# Patient Record
Sex: FEMALE
[De-identification: names, ages, dates, MRNs, and addresses within clinical notes are randomized per-mention and may not be internally consistent; named-entity substitution may affect disease eponyms.]

---

## 2017-06-30 ENCOUNTER — HOSPITAL ENCOUNTER (OUTPATIENT)
Dept: HOSPITAL 53 - M SMT | Age: 27
End: 2017-06-30
Attending: ADVANCED PRACTICE MIDWIFE
Payer: COMMERCIAL

## 2017-06-30 DIAGNOSIS — Z34.81: Primary | ICD-10-CM

## 2017-06-30 LAB
BASOPHILS # BLD AUTO: 0 K/MM3 (ref 0–0.2)
BASOPHILS NFR BLD AUTO: 0.7 % (ref 0–1)
EOSINOPHIL # BLD AUTO: 0.1 K/MM3 (ref 0–0.5)
EOSINOPHIL NFR BLD AUTO: 1.2 % (ref 0–3)
ERYTHROCYTE [DISTWIDTH] IN BLOOD BY AUTOMATED COUNT: 13 % (ref 11.5–14.5)
LARGE UNSTAINED CELL #: 0.1 K/MM3 (ref 0–0.4)
LARGE UNSTAINED CELL %: 0.9 % (ref 0–4)
LYMPHOCYTES # BLD AUTO: 1.8 K/MM3 (ref 1.5–6.5)
LYMPHOCYTES NFR BLD AUTO: 25.4 % (ref 24–44)
MCH RBC QN AUTO: 32.1 PG (ref 27–33)
MCHC RBC AUTO-ENTMCNC: 34.9 G/DL (ref 32–36.5)
MCV RBC AUTO: 92 FL (ref 80–96)
MONOCYTES # BLD AUTO: 0.4 K/MM3 (ref 0–0.8)
MONOCYTES NFR BLD AUTO: 5.3 % (ref 0–5)
NEUTROPHILS # BLD AUTO: 4.5 K/MM3 (ref 1.8–7.7)
NEUTROPHILS NFR BLD AUTO: 66.5 % (ref 36–66)
WBC # BLD AUTO: 6.7 K/MM3 (ref 4–10)

## 2017-07-03 LAB
HBSAG PRENATAL: NEGATIVE
PLATELET # BLD AUTO: 336 K/MM3 (ref 150–450)

## 2017-07-10 ENCOUNTER — HOSPITAL ENCOUNTER (EMERGENCY)
Dept: HOSPITAL 53 - M ED | Age: 27
Discharge: HOME | End: 2017-07-10
Payer: COMMERCIAL

## 2017-07-10 VITALS — WEIGHT: 140.3 LBS | HEIGHT: 64 IN | BODY MASS INDEX: 23.95 KG/M2

## 2017-07-10 VITALS — SYSTOLIC BLOOD PRESSURE: 102 MMHG | DIASTOLIC BLOOD PRESSURE: 60 MMHG

## 2017-07-10 DIAGNOSIS — O98.819: Primary | ICD-10-CM

## 2017-07-10 DIAGNOSIS — Z3A.00: ICD-10-CM

## 2017-09-18 ENCOUNTER — HOSPITAL ENCOUNTER (OUTPATIENT)
Dept: HOSPITAL 53 - M SMT | Age: 27
End: 2017-09-18
Attending: OBSTETRICS & GYNECOLOGY
Payer: COMMERCIAL

## 2017-09-18 DIAGNOSIS — Z3A.19: ICD-10-CM

## 2017-09-18 DIAGNOSIS — Z36: Primary | ICD-10-CM

## 2017-09-18 NOTE — REP
Obstetric ultrasound for fetal anatomy:

 

There is a single intrauterine gestation in a vertex presentation.  There is

fetal motion and cardiac activity.  The fetal heart rate is 139 beats per

minute.

 

Placenta is posterior.  There is no placenta previa or abruptio.  The placenta is

grade zero.

 

The amniotic fluid volume subjectively is normal.  The cervix is 4.1 cm length.

Maternal adnexa and cul-de-sac are unremarkable.

 

Based on today's ultrasound the gestational age is 19 weeks 2 days with an ZEFERINO of

two 10/20/2018.  The LMP is unknown.

 

Fetal weight is 311 grams (0 pounds, 10 ounces).  This is the 61st percentile for

19 weeks 2 days.

 

The following fetal anatomic structures are identified and are unremarkable:

 

Cranium, choroid plexus, cavum septum pellucidum, cerebellum/posterior fossa,

fetal face, facial profile, upper lip, lungs, four-chamber heart, cardiac right

and left ventricular outflow tracts, diaphragm, stomach, cord insertion,

three-vessel cord, kidneys, bladder, spine and upper lower extremities.

 

No fetal anomalies are identified.

 

 

Signed by

Usama Duffy MD 09/18/2017 12:37 P

## 2017-09-27 ENCOUNTER — HOSPITAL ENCOUNTER (OUTPATIENT)
Dept: HOSPITAL 53 - M RAD | Age: 27
End: 2017-09-27
Attending: ADVANCED PRACTICE MIDWIFE
Payer: COMMERCIAL

## 2017-09-27 DIAGNOSIS — M25.561: Primary | ICD-10-CM

## 2017-09-27 DIAGNOSIS — M25.562: ICD-10-CM

## 2017-09-28 NOTE — REP
Clinical: Bilateral knee pain .

 

Technique:  Gray scale and color Doppler evaluation using linear high frequency

transducer.

 

Findings:

Ultrasound examination of the right and left lower extremity deep venous

structures from the common femoral vein to the popliteal vein demonstrates normal

compressibility flow and wave patterns in response to respiration and

augmentation.  There is no evidence for deep venous thrombosis. Incidental note

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

 

Impression:

No evidence for deep venous thrombosis the bilateral lower extremities.

 

 

Signed by

Anshu Lindsey MD 09/28/2017 08:17 A

## 2017-11-14 ENCOUNTER — HOSPITAL ENCOUNTER (OUTPATIENT)
Dept: HOSPITAL 53 - M LDO | Age: 27
Discharge: HOME | End: 2017-11-14
Attending: ADVANCED PRACTICE MIDWIFE
Payer: COMMERCIAL

## 2017-11-14 VITALS — SYSTOLIC BLOOD PRESSURE: 107 MMHG | DIASTOLIC BLOOD PRESSURE: 59 MMHG

## 2017-11-14 VITALS — WEIGHT: 158.95 LBS | BODY MASS INDEX: 27.14 KG/M2 | HEIGHT: 64 IN

## 2017-11-14 VITALS — SYSTOLIC BLOOD PRESSURE: 103 MMHG | DIASTOLIC BLOOD PRESSURE: 62 MMHG

## 2017-11-14 DIAGNOSIS — N39.0: ICD-10-CM

## 2017-11-14 DIAGNOSIS — O99.89: Primary | ICD-10-CM

## 2017-11-14 DIAGNOSIS — Z3A.27: ICD-10-CM

## 2017-11-14 LAB
ALBUMIN SERPL BCG-MCNC: 2.7 GM/DL (ref 3.2–5.2)
ANION GAP SERPL CALC-SCNC: 6 MEQ/L (ref 8–16)
BUN SERPL-MCNC: 7 MG/DL (ref 7–18)
CALCIUM SERPL-MCNC: 8.1 MG/DL (ref 8.5–10.1)
CHLORIDE SERPL-SCNC: 109 MEQ/L (ref 98–107)
CO2 SERPL-SCNC: 26 MEQ/L (ref 21–32)
CREAT SERPL-MCNC: 0.54 MG/DL (ref 0.55–1.02)
ERYTHROCYTE [DISTWIDTH] IN BLOOD BY AUTOMATED COUNT: 13 % (ref 11.5–14.5)
GFR SERPL CREATININE-BSD FRML MDRD: > 60 ML/MIN/{1.73_M2} (ref 60–?)
GLUCOSE SERPL-MCNC: 72 MG/DL (ref 70–105)
MCH RBC QN AUTO: 31.8 PG (ref 27–33)
MCHC RBC AUTO-ENTMCNC: 34 G/DL (ref 32–36.5)
MCV RBC AUTO: 93.6 FL (ref 80–96)
NRBC BLD AUTO-RTO: 0 % (ref 0–0)
PHOSPHATE SERPL-MCNC: 3.1 MG/DL (ref 2.5–4.9)
PLATELET # BLD AUTO: 241 10^3/UL (ref 150–450)
POTASSIUM SERPL-SCNC: 3.7 MEQ/L (ref 3.5–5.1)
SODIUM SERPL-SCNC: 141 MEQ/L (ref 136–145)
WBC # BLD AUTO: 7.1 10^3/UL (ref 4–10)

## 2017-11-14 NOTE — REPUSA
Clinical history: Renal failure.

Findings: The urinary bladder demonstrates echogenic non-shadowing debris. Normal ureteral Jets are n
oted. No urinary bladder masses are seen. The right kidney measures 11.8 x 5.2 x 4.7 cm. The left kid
kt measures 12.6 x 5.6 x 5.4 cm. The kidneys demonstrate normal echotexture and echogenicity. There 
is no evidence of hydronephrosis or nephrolithiasis. No renal masses are seen. No free fluid is appre
ciated. Single intrauterine pregnancy is noted. Fetal heart rate measures 139 bpm.

Impression:

1. Unremarkable ultrasound examination of the kidneys.

2. Debris is seen within the urinary bladder. Differential diagnosis includes infectious versus hemor
rhagic material. Follow-up is recommended as clinically indicated.

     Electronically signed by MALIKA SAAB MD on 11/14/2017 10:51:39 PM ET

## 2017-11-29 ENCOUNTER — HOSPITAL ENCOUNTER (OUTPATIENT)
Dept: HOSPITAL 53 - M SMT | Age: 27
End: 2017-11-29
Attending: ADVANCED PRACTICE MIDWIFE
Payer: COMMERCIAL

## 2017-11-29 DIAGNOSIS — Z34.83: Primary | ICD-10-CM

## 2017-11-29 LAB
BASOPHILS # BLD AUTO: 0 10^3/UL (ref 0–0.2)
BASOPHILS NFR BLD AUTO: 0.6 % (ref 0–1)
EOSINOPHIL # BLD AUTO: 0.1 10^3/UL (ref 0–0.5)
EOSINOPHIL NFR BLD AUTO: 1.3 % (ref 0–3)
ERYTHROCYTE [DISTWIDTH] IN BLOOD BY AUTOMATED COUNT: 13.1 % (ref 11.5–14.5)
IMM GRANULOCYTES NFR BLD: 0.4 % (ref 0–0)
LYMPHOCYTES # BLD AUTO: 1 10^3/UL (ref 1.5–6.5)
LYMPHOCYTES NFR BLD AUTO: 18 % (ref 24–44)
MCH RBC QN AUTO: 30.9 PG (ref 27–33)
MCHC RBC AUTO-ENTMCNC: 32.2 G/DL (ref 32–36.5)
MCV RBC AUTO: 95.8 FL (ref 80–96)
MONOCYTES # BLD AUTO: 0.3 10^3/UL (ref 0–0.8)
MONOCYTES NFR BLD AUTO: 5.9 % (ref 0–5)
NEUTROPHILS # BLD AUTO: 4 10^3/UL (ref 1.8–7.7)
NEUTROPHILS NFR BLD AUTO: 73.8 % (ref 36–66)
NRBC BLD AUTO-RTO: 0 % (ref 0–0)
PLATELET # BLD AUTO: 259 10^3/UL (ref 150–450)
WBC # BLD AUTO: 5.4 10^3/UL (ref 4–10)

## 2017-12-18 ENCOUNTER — HOSPITAL ENCOUNTER (OUTPATIENT)
Dept: HOSPITAL 53 - M LAB | Age: 27
End: 2017-12-18
Attending: ADVANCED PRACTICE MIDWIFE
Payer: COMMERCIAL

## 2017-12-18 DIAGNOSIS — Z36.89: Primary | ICD-10-CM

## 2018-01-17 ENCOUNTER — HOSPITAL ENCOUNTER (OUTPATIENT)
Dept: HOSPITAL 53 - M LAB REF | Age: 28
End: 2018-01-17
Payer: COMMERCIAL

## 2018-01-17 DIAGNOSIS — Z34.83: Primary | ICD-10-CM

## 2018-02-17 ENCOUNTER — HOSPITAL ENCOUNTER (INPATIENT)
Dept: HOSPITAL 53 - M LDI | Age: 28
LOS: 2 days | Discharge: HOME | DRG: 560 | End: 2018-02-19
Attending: ADVANCED PRACTICE MIDWIFE | Admitting: ADVANCED PRACTICE MIDWIFE
Payer: COMMERCIAL

## 2018-02-17 DIAGNOSIS — Z3A.40: ICD-10-CM

## 2018-02-17 DIAGNOSIS — O48.0: Primary | ICD-10-CM

## 2018-02-17 LAB
AMPHETAMINES UR QL SCN: NEGATIVE
BARBITURATES UR QL SCN: NEGATIVE
BENZODIAZ UR QL SCN: NEGATIVE
BZE UR QL SCN: NEGATIVE
CANNABINOIDS UR QL SCN: NEGATIVE
HEMATOCRIT: 30 % (ref 36–47)
HEMOGLOBIN: 9.9 G/DL (ref 12–16)
MEAN CORPUSCULAR HEMOGLOBIN: 28.5 PG (ref 27–33)
MEAN CORPUSCULAR HGB CONC: 33 G/DL (ref 32–36.5)
MEAN CORPUSCULAR VOLUME: 86.5 FL (ref 80–96)
METHADONE URINE REFLEX: NEGATIVE
NRBC BLD AUTO-RTO: 0 % (ref 0–0)
OPIATES UR QL SCN: NEGATIVE
PCP UR QL SCN: NEGATIVE
PLATELET COUNT, AUTOMATED: 216 10^3/UL (ref 150–450)
RED BLOOD COUNT: 3.47 10^6/UL (ref 4–5.4)
RED CELL DISTRIBUTION WIDTH: 13.9 % (ref 11.5–14.5)
WHITE BLOOD COUNT: 6.1 10^3/UL (ref 4–10)

## 2018-02-17 PROCEDURE — 10907ZC DRAINAGE OF AMNIOTIC FLUID, THERAPEUTIC FROM PRODUCTS OF CONCEPTION, VIA NATURAL OR ARTIFICIAL OPENING: ICD-10-PCS

## 2018-02-17 PROCEDURE — 3E0DXGC INTRODUCTION OF OTHER THERAPEUTIC SUBSTANCE INTO MOUTH AND PHARYNX, EXTERNAL APPROACH: ICD-10-PCS

## 2018-02-17 RX ADMIN — MISOPROSTOL 1 MCG: 100 TABLET ORAL at 09:24

## 2018-02-17 RX ADMIN — IBUPROFEN 1 MG: 800 TABLET, FILM COATED ORAL at 19:54

## 2018-02-17 RX ADMIN — PROMETHAZINE HYDROCHLORIDE 1 MG: 25 INJECTION INTRAMUSCULAR; INTRAVENOUS at 16:14

## 2018-02-17 RX ADMIN — BUTORPHANOL TARTRATE 1 MG: 2 INJECTION, SOLUTION INTRAMUSCULAR; INTRAVENOUS at 16:14

## 2018-02-17 RX ADMIN — Medication 1 MLS/HR: at 18:20

## 2018-02-17 RX ADMIN — MISOPROSTOL 1 MCG: 100 TABLET ORAL at 13:32

## 2018-02-18 RX ADMIN — Medication 1 TAB: at 09:01

## 2018-02-18 RX ADMIN — ACETAMINOPHEN 1 MG: 500 TABLET ORAL at 00:48

## 2018-02-18 RX ADMIN — ACETAMINOPHEN 1 MG: 500 TABLET ORAL at 16:44

## 2018-02-18 RX ADMIN — IBUPROFEN 1 MG: 800 TABLET, FILM COATED ORAL at 09:02

## 2018-02-19 LAB — SYPHILIS: NONREACTIVE

## 2018-02-19 RX ADMIN — MEASLES, MUMPS, AND RUBELLA VIRUS VACCINE LIVE 1 ML: 1000; 12500; 1000 INJECTION, POWDER, LYOPHILIZED, FOR SUSPENSION SUBCUTANEOUS at 08:31

## 2018-02-19 RX ADMIN — ACETAMINOPHEN 1 MG: 500 TABLET ORAL at 08:28

## 2018-02-19 RX ADMIN — IBUPROFEN 1 MG: 800 TABLET, FILM COATED ORAL at 14:00

## 2018-02-19 RX ADMIN — IBUPROFEN 1 MG: 800 TABLET, FILM COATED ORAL at 01:47

## 2018-02-19 RX ADMIN — Medication 1 TAB: at 08:28

## 2018-03-28 ENCOUNTER — HOSPITAL ENCOUNTER (OUTPATIENT)
Dept: HOSPITAL 53 - M LAB REF | Age: 28
End: 2018-03-28
Attending: ADVANCED PRACTICE MIDWIFE
Payer: COMMERCIAL

## 2018-03-28 DIAGNOSIS — R30.0: Primary | ICD-10-CM

## 2018-03-28 PROCEDURE — 87088 URINE BACTERIA CULTURE: CPT

## 2018-03-28 PROCEDURE — 87186 SC STD MICRODIL/AGAR DIL: CPT

## 2020-12-16 ENCOUNTER — HOSPITAL ENCOUNTER (OUTPATIENT)
Dept: HOSPITAL 53 - M PLALAB | Age: 30
End: 2020-12-16
Attending: OBSTETRICS & GYNECOLOGY
Payer: SELF-PAY

## 2020-12-16 DIAGNOSIS — Z3A.09: Primary | ICD-10-CM

## 2021-01-11 ENCOUNTER — HOSPITAL ENCOUNTER (OUTPATIENT)
Dept: HOSPITAL 53 - M PLALAB | Age: 31
End: 2021-01-11
Attending: OBSTETRICS & GYNECOLOGY
Payer: SELF-PAY

## 2021-01-11 DIAGNOSIS — Z34.91: Primary | ICD-10-CM

## 2021-01-11 DIAGNOSIS — Z3A.09: ICD-10-CM

## 2021-01-11 LAB
HCT VFR BLD AUTO: 33.9 % (ref 36–47)
HCV AB SER QL: < 0 INDEX (ref ?–0.8)
HGB BLD-MCNC: 11.3 G/DL (ref 12–15.5)
HIV 1+2 AB+HIV1 P24 AG SERPL QL IA: NEGATIVE
MCH RBC QN AUTO: 31.4 PG (ref 27–33)
MCHC RBC AUTO-ENTMCNC: 33.3 G/DL (ref 32–36.5)
MCV RBC AUTO: 94.2 FL (ref 80–96)
PLATELET # BLD AUTO: 273 10^3/UL (ref 150–450)
RBC # BLD AUTO: 3.6 10^6/UL (ref 4–5.4)
WBC # BLD AUTO: 6.1 10^3/UL (ref 4–10)

## 2021-01-15 ENCOUNTER — HOSPITAL ENCOUNTER (OUTPATIENT)
Dept: HOSPITAL 53 - M SFHCWAGY | Age: 31
End: 2021-01-15
Attending: HOSPITALIST
Payer: COMMERCIAL

## 2021-01-15 DIAGNOSIS — Z3A.09: Primary | ICD-10-CM

## 2021-01-15 LAB
CHLAMYDIA DNA AMPLIFICATION: NEGATIVE
N GONORRHOEA RRNA SPEC QL NAA+PROBE: NEGATIVE

## 2021-02-12 ENCOUNTER — HOSPITAL ENCOUNTER (OUTPATIENT)
Dept: HOSPITAL 53 - M SFHCWAGY | Age: 31
End: 2021-02-12
Attending: OBSTETRICS & GYNECOLOGY
Payer: COMMERCIAL

## 2021-02-12 DIAGNOSIS — Z3A.09: Primary | ICD-10-CM

## 2021-02-25 ENCOUNTER — HOSPITAL ENCOUNTER (OUTPATIENT)
Dept: HOSPITAL 53 - M WHC | Age: 31
End: 2021-02-25
Attending: ADVANCED PRACTICE MIDWIFE
Payer: COMMERCIAL

## 2021-02-25 DIAGNOSIS — Z3A.19: ICD-10-CM

## 2021-02-25 DIAGNOSIS — Z34.82: Primary | ICD-10-CM

## 2021-02-25 NOTE — REP
INDICATION:

ANATOMY.  Nineteen weeks 1 day from known ZEFERINO July 21, 2021.



COMPARISON:

None.



TECHNIQUE:

Transabdominal obstetric sonography.



FINDINGS:

Scanning through the gravid uterus demonstrates a viable single intrauterine gestation

in breech fetal lie.  Fetal motion is observed and fetal heart rate is recorded at 161

beats per minute.  A anterior placenta is seen, grade 0, without evidence of placenta

previa. Closed cervical length is measured at 3.4 cm transabdominally.  No

extrauterine abnormality is observed.  Amniotic fluid is subjectively normal.



No fetal anomaly is seen.  The following fetal anatomic structures are identified and

felt to be sonographically unremarkable:  Fetal cranium, choroid plexus, cavum,

cerebellum and posterior fossa, face and profile, lungs, four-chamber heart with left

and right ventricular outflow tract views, diaphragm, left-sided stomach, abdominal

wall cord insertion, three-vessel umbilical cord, kidneys and bladder, spine, and

upper and lower extremities.



Biometry chart:

BPD 4.5 cm, 19 weeks 4 days

Head circumference 17.0 cm, 19 weeks 4 days

Abdominal circumference 14.7 cm, 20 weeks 0 days

Femur length 3.2 cm, 19 weeks 6 days

Humeral length 3.0 cm, 20 weeks 0 days

HC AC ratio normal 1.16

Cephalic index normal 0.72

Estimated fetal weight 322 g, 0 lb 11 oz, 86th percentile for 19 weeks 1 day



IMPRESSION:

Viable single intrauterine gestation at 19 weeks 6 days by today's composite

sonographic criteria.  ZEFERINO by today's sonography July 16, 2021.  No complication

identified.



Expected gestational age estimate based on prior sonography is 19 weeks 1 day.  ZEFERINO by

prior sonography 21 July 2021.





<Electronically signed by Anthony Laguerre > 02/25/21 [id number]

## 2021-03-12 ENCOUNTER — HOSPITAL ENCOUNTER (OUTPATIENT)
Dept: HOSPITAL 53 - M PLALAB | Age: 31
End: 2021-03-12
Attending: OBSTETRICS & GYNECOLOGY
Payer: COMMERCIAL

## 2021-03-12 DIAGNOSIS — Z53.9: ICD-10-CM

## 2021-03-12 DIAGNOSIS — Z3A.21: ICD-10-CM

## 2021-03-12 DIAGNOSIS — Z34.92: Primary | ICD-10-CM

## 2021-04-30 ENCOUNTER — HOSPITAL ENCOUNTER (OUTPATIENT)
Dept: HOSPITAL 53 - M PLALAB | Age: 31
End: 2021-04-30
Attending: OBSTETRICS & GYNECOLOGY
Payer: COMMERCIAL

## 2021-04-30 DIAGNOSIS — Z3A.09: ICD-10-CM

## 2021-04-30 DIAGNOSIS — Z34.91: Primary | ICD-10-CM

## 2021-05-25 ENCOUNTER — HOSPITAL ENCOUNTER (OUTPATIENT)
Dept: HOSPITAL 53 - M WHC | Age: 31
End: 2021-05-25
Attending: ADVANCED PRACTICE MIDWIFE
Payer: MEDICAID

## 2021-05-25 DIAGNOSIS — Z3A.00: ICD-10-CM

## 2021-05-25 DIAGNOSIS — O24.419: Primary | ICD-10-CM

## 2021-06-02 ENCOUNTER — HOSPITAL ENCOUNTER (OUTPATIENT)
Dept: HOSPITAL 53 - M WHC | Age: 31
End: 2021-06-02
Attending: ADVANCED PRACTICE MIDWIFE
Payer: COMMERCIAL

## 2021-06-02 DIAGNOSIS — O24.419: Primary | ICD-10-CM

## 2021-06-02 DIAGNOSIS — Z3A.33: ICD-10-CM

## 2021-06-02 NOTE — REP
INDICATION:

GROWTH/DIABETES



COMPARISON:

02/25/2021



TECHNIQUE:

Transabdominal obstetrical ultrasound with color Doppler evaluation.



FINDINGS:

Examination demonstrates a single live intrauterine pregnancy in cephalic

presentation.  Fetal motion is identified by technologist.  Placenta is noted anterior

and  grade 1 without evidence for placenta previa or abruption.  Amniotic fluid volume

is normal.  Cervix measures 3.2 cm in length and appears closed..



Gestational age by LMP and 1st U/S 33 weeks 0 days with ZEFERINO 07/21/2021.

Gestational age by current measurements 33 weeks 6 days with ZEFERINO 07/15/2021.



FHR equals 140 beats per minute.



BPD:      8.6 cm at        34 weeks 4 days

HC:         30.8 cm at         34 weeks 3 days

AC:         30.1 cm at      34 weeks 0 days

FL:          6.6 cm at      34 weeks 0 days

HL:          5.1 cm at      32 weeks 1 day

HC/AC: 1.02

Estimated fetal weight 2350 grams (75thpercentile).



TOMEKA: 12.4 cm

Umbilical artery SD ratio: 2.33 (1.79-3.77)



IMPRESSION:

Single live advanced gestation in cephalic presentation demonstrating appropriate

amniotic fluid index and estimated fetal weight.





<Electronically signed by Anshu Lindsey > 06/02/21 [id number]

## 2021-06-22 ENCOUNTER — HOSPITAL ENCOUNTER (OUTPATIENT)
Dept: HOSPITAL 53 - M WHC | Age: 31
End: 2021-06-22
Attending: ADVANCED PRACTICE MIDWIFE
Payer: COMMERCIAL

## 2021-06-22 DIAGNOSIS — O24.419: Primary | ICD-10-CM

## 2021-06-22 DIAGNOSIS — Z3A.36: ICD-10-CM

## 2021-06-22 NOTE — REP
INDICATION:

GROWTH.



COMPARISON:

None.



TECHNIQUE:

Transabdominal



FINDINGS:

Multiple ultrasonographic images of the gravid uterus shows a single living

intrauterine gestation in the cephalic presentation.  Doppler interrogation of the

fetal heart shows a heart rate of 147 beats per minute.  The placenta is anterior and

not low lying.  Secondary to the low position of the fetal head inaccurate cervical

length measurement could not be obtained.  Doppler interrogation of the umbilical

artery shows an AB ratio of 2.26.  This is within the normal range.  The subjective

amniotic fluid volume is within normal limits.  The calculated amniotic fluid index is

11.0 within expected range 7.7 to 24.9.



BPD: 9 cm 36 weeks 4 days

HC: 31.9 cm 35 weeks 6 days

AC: 32.1 cm 36 weeks 0 days

FL: 7.1 cm 36 weeks 3 days

The estimated fetal weight is 2865 g which is at the 59th percentile for 35 week 6 day

gestational age.



IMPRESSION:

Single living intrauterine gestation as described above with an estimated gestational

age of 36 weeks 1 day via composite criteria and an estimated date of delivery of

07/19/2021 by today's exam.





<Electronically signed by Jesus Irizarry > 06/22/21 [id number]

## 2021-07-02 ENCOUNTER — HOSPITAL ENCOUNTER (OUTPATIENT)
Dept: HOSPITAL 53 - M SFHCWAGY | Age: 31
End: 2021-07-02
Attending: ADVANCED PRACTICE MIDWIFE
Payer: COMMERCIAL

## 2021-07-02 DIAGNOSIS — O24.419: Primary | ICD-10-CM

## 2021-07-02 DIAGNOSIS — Z3A.00: ICD-10-CM

## 2021-07-12 ENCOUNTER — HOSPITAL ENCOUNTER (INPATIENT)
Dept: HOSPITAL 53 - M WHC | Age: 31
LOS: 2 days | Discharge: HOME | DRG: 560 | End: 2021-07-14
Attending: ADVANCED PRACTICE MIDWIFE | Admitting: ADVANCED PRACTICE MIDWIFE
Payer: COMMERCIAL

## 2021-07-12 VITALS — DIASTOLIC BLOOD PRESSURE: 57 MMHG | SYSTOLIC BLOOD PRESSURE: 103 MMHG

## 2021-07-12 VITALS — SYSTOLIC BLOOD PRESSURE: 104 MMHG | DIASTOLIC BLOOD PRESSURE: 60 MMHG

## 2021-07-12 VITALS — SYSTOLIC BLOOD PRESSURE: 104 MMHG | DIASTOLIC BLOOD PRESSURE: 57 MMHG

## 2021-07-12 VITALS — DIASTOLIC BLOOD PRESSURE: 57 MMHG | SYSTOLIC BLOOD PRESSURE: 95 MMHG

## 2021-07-12 VITALS — SYSTOLIC BLOOD PRESSURE: 103 MMHG | DIASTOLIC BLOOD PRESSURE: 56 MMHG

## 2021-07-12 VITALS — BODY MASS INDEX: 29.02 KG/M2 | HEIGHT: 64 IN | WEIGHT: 169.98 LBS

## 2021-07-12 VITALS — DIASTOLIC BLOOD PRESSURE: 55 MMHG | SYSTOLIC BLOOD PRESSURE: 97 MMHG

## 2021-07-12 VITALS — DIASTOLIC BLOOD PRESSURE: 57 MMHG | SYSTOLIC BLOOD PRESSURE: 106 MMHG

## 2021-07-12 VITALS — DIASTOLIC BLOOD PRESSURE: 64 MMHG | SYSTOLIC BLOOD PRESSURE: 105 MMHG

## 2021-07-12 VITALS — DIASTOLIC BLOOD PRESSURE: 46 MMHG | SYSTOLIC BLOOD PRESSURE: 97 MMHG

## 2021-07-12 DIAGNOSIS — Z3A.38: ICD-10-CM

## 2021-07-12 DIAGNOSIS — O41.03X0: ICD-10-CM

## 2021-07-12 DIAGNOSIS — Z91.14: ICD-10-CM

## 2021-07-12 LAB
GLUCOSE SERPL-MCNC: 62 MG/DL (ref ?–200)
HCT VFR BLD AUTO: 32.5 % (ref 36–47)
HGB BLD-MCNC: 11.1 G/DL (ref 12–15.5)
MCH RBC QN AUTO: 32.5 PG (ref 27–33)
MCHC RBC AUTO-ENTMCNC: 34.2 G/DL (ref 32–36.5)
MCV RBC AUTO: 95 FL (ref 80–96)
PLATELET # BLD AUTO: 197 10^3/UL (ref 150–450)
RBC # BLD AUTO: 3.42 10^6/UL (ref 4–5.4)
WBC # BLD AUTO: 5.7 10^3/UL (ref 4–10)

## 2021-07-12 PROCEDURE — 3E033VJ INTRODUCTION OF OTHER HORMONE INTO PERIPHERAL VEIN, PERCUTANEOUS APPROACH: ICD-10-PCS | Performed by: ADVANCED PRACTICE MIDWIFE

## 2021-07-12 RX ADMIN — MISOPROSTOL SCH MCG: 100 TABLET ORAL at 18:09

## 2021-07-12 NOTE — REP
INDICATION:

FETAL GROWTH



COMPARISON:

06/22/2021



TECHNIQUE:

Transabdominal obstetrical ultrasound with color Doppler evaluation.



FINDINGS:

Examination demonstrates a single live intrauterine pregnancy in cephalic

presentation.  Fetal motion is identified by technologist.  Placenta is noted anterior

and  grade 2 without evidence for placenta previa or abruption.  Amniotic fluid volume

is normal.



TOMEKA: 6.8 cm (7.2-23.0)

Umbilical artery SD ratio: 1.94 (1.52-3.31)



Selected gestational age: 38 weeks 5 days with ZEFERINO 07/21/2021.

Gestational age by current measurements 37 weeks 2 days with ZEFERINO 07/31/2021.



FHR equals 152 beats per minute.



BPD:      9.3 cm at        38 weeks 0 days

HC:         32.6 cm at         37 weeks 0 days

AC:         35.4 cm at      39 weeks 2 days

FL:          7.2 cm at      37 weeks 0 days

HL:          6.1 cm at      35 weeks 0 days

HC/AC: 0.92

Estimated fetal weight 3476 grams (59thpercentile).



IMPRESSION:

1. Single live intrauterine pregnancy in cephalic presentation demonstrating

appropriate estimated fetal weight.

2. Amniotic fluid volume is below normal range suggesting oligohydramnios.





<Electronically signed by Anshu Lindsey > 07/12/21 [id number] intact

## 2021-07-12 NOTE — HPEPDOC
Obstetrical History & Physical


General


Date of Admission


2021 at 14:31


Primary Care Physician:  DEENA NI CNM





History of Present Illness


Shanna is a 30-year-old female who is a  who is 38.5 weeks gestation with 

an ZEFERINO of 21 based off of her LMP and consistent with her first trimester 

ultrasound done at 9 weeks. She initiated care in her first trimester with [facility].

Her pregnancy has been complicated by gestational diabetes. She was suppose to 

start Metformin and only did it for a few days because of a rash that started 

after she initiated Metformin. It was later discovered that the rash was a 

reaction to new lotion that she started and Metformin was restarted but patient 

reports she only took it for about a week. She was not compliant with taking her

blood sugars making it difficult to adjust medication. She presents to L&D today

for an induction of labor related to A2GDM and non-compliance with taking 

medication or blood sugars.


Chief Complaint:  Induction of labor (A2GDM-)


Information Provided By:  Family, 


Age:  30


:  5


Term:  4


Pre-term:  0


Abortions:  0


Livin





Prenatal Care


Prenatal Care:  [facility]





Prenatal Dating


Final EDC:  2021


Final EDC by:  LMP


EGA at Admission:  38.5





Antepartum Course


Prenatal Diagnos(e)s


A2GDM-not controlled


Height (inches):  64


Pre-Pregnancy weight (lbs.):  140


Admission Weight (lbs.):  169


Change in Weight (lbs.):  29





Past Medical History


Past Obstetrical History #1:  


   Past Obstetrical History:  Primgravida


   Date of Delivery:  2010


   Gestation:  40


   Type of Delivery:  Spontaneous Vaginal Del.


   Sex of Infant:  Female (weight: 7 kbs 5 oz)


   Complications:  Yes (GDM)


Past Obstetrical History #2:  


   Past Obstetrical History:  Multigravida


   Date of Delivery:  Mar 8, 2014


   Gestation:  41


   Type of Delivery:  Spontaneous Vaginal Del.


   Sex of Infant:  Male (weight 8 lbs 6 oz)


   Complications:  No


Past Obstetrical History #3:  


   Past Obstetrical History:  Multigravida


   Date of Delivery:  Mar 21, 2016


   Gestation:  40


   Type of Delivery:  Spontaneous Vaginal Del.


   Sex of Infant:  Male (weight 8 lbs 12 oz)


   Complications:  No


Past Obstetrical History #4:  


   Past Obstetrical History:  Multigravida


   Date of Delivery:  2018


   Gestation:  40.5


   Type of Delivery:  Spontaneous Vaginal Del.


   Sex of Infant:  Male (8 lbs 2 oz)


   Complications:  Yes (GDM)


GYN History:  Human papillomavirus(HPV)


Past Medical History


Medical History


kidney stones


shingles


Gestational diabetes x3


Surgical History:  Gallbladder, Other (lithotripsy)





Family History


Significant Family History:  Asthma





Social History


Marital Status:  


Family situation:  Spouse/partner home


Psychosocial History:  No pertinent psych hx


* Smoker:  non-smoker


Alcohol:  Denies


Drugs:  denies





Abuse Violence Screening


Have you been hit/kicked/slapp:  No


Have you been sexually assault:  No





Allergies


Coded Allergies:  


     latex (Verified  Allergy, Intermediate, Hives, 21)





Medications


Scheduled


Metformin HCl (Metformin HCl) 500 Mg Tablet, 500 MG PO DAILY


[id number]/Iron Fum/Folic/Docusate (Prenatal 19 Tablet) 1 Tab Tab, 1 TAB PO DAILY





Physical Examination


Physical Examination


GENERAL: Alert and oriented times three.


BREAST: .


RESPIRATORY: regular rate and rhythm


ABDOMEN: Gravid and non-tender to touch.


FETUS: Is vertex (VTX) by sterile vaginal examination (SVE), fetus is vertex 

(VTX) by Leopold.


EXTREMITIES: No edema. No clonus. Deep tendon reflexes (DTRs) + 2.





Vital Signs/I&O





Vital Signs








  Date Time  Temp Pulse Resp B/P (MAP) Pulse Ox O2 Delivery O2 Flow Rate FiO2


 


21 17:27  67 18 104/60 (75) 100 Room Air  


 


21 14:52 97.6       











Laboratory Data


24H LABS


Laboratory Tests 2


21 14:38: Serology Scanned Report Hepatitis B Testing


21 15:49: 


Nucleated Red Blood Cells % (auto) 0.0, Random Glucose 62, Syphilis Serology 

NONREACTIVE


CBC/BMP


Laboratory Tests


21 15:49








Urine Culture:  No Growth





Pertinent Laboratoy Data


Blood Type:  O+


RBC Antibody Screen:  Negative


HIV:  Negative


Hepatitis B:  Negative


Hepatitis C:  Negative


Rapid Plasma Reagin:  Nonreactive


Rubella:  Immune


Chlamydia/Gonorrhea:  Negative


Group B Streptococcus:  Positive


Glucose Tolerance Test:  157





Anatomy Ultrasound


Ultrasound Date:  2021


Placenta Location:  Anterior


Normal Anatomy:  Yes


Placenta Previa:  No


Estimated Fetal Weight (grams):  2865





Vaginal Examination


Dilation:  2cm


Effacement:  50%


Station:  -2


Cervical Consistency:  Soft


Cervical Position:  Middle


Fetal Presentation:  Cephalic presentation


Fetal Position:  Vertex (occiput)





Fetal Assessment


Fetal Heart Rate (FHR):  120


Variability:  Moderate


Accelerations:  Positive


Decelerations:  None





Tocometer


Contractions:  Yes


Frequency:  other (4-5 minutes)


Multi-drug resistant Organism:  No history of MDRO





Assessment/Plan


Assessment


IUP at 38.5 weeks gestation


GBS positive 


Category I FHR tracing


[id number] metformin-non-compliant





Plan


Admit to L&D. 


OOB ad carol. 


Diet: regular now then clear liquid diet with starting IV Pitocin.


Group B Streptococcus (GBS) positive. Start antibiotics per order. 


Labs and intravenous (IV) per unit protocol.


Counseled on Cytotec and IV Pitocin for induction of labor (IOL).


Anesthesia consult per patient's request. 


Anticipate cervical ripening. 


C-S as appropriate.











DEENA NI CNM            2021 18:06

## 2021-07-13 VITALS — SYSTOLIC BLOOD PRESSURE: 116 MMHG | DIASTOLIC BLOOD PRESSURE: 57 MMHG

## 2021-07-13 VITALS — DIASTOLIC BLOOD PRESSURE: 56 MMHG | SYSTOLIC BLOOD PRESSURE: 102 MMHG

## 2021-07-13 VITALS — DIASTOLIC BLOOD PRESSURE: 55 MMHG | SYSTOLIC BLOOD PRESSURE: 100 MMHG

## 2021-07-13 VITALS — SYSTOLIC BLOOD PRESSURE: 103 MMHG | DIASTOLIC BLOOD PRESSURE: 58 MMHG

## 2021-07-13 VITALS — DIASTOLIC BLOOD PRESSURE: 62 MMHG | SYSTOLIC BLOOD PRESSURE: 105 MMHG

## 2021-07-13 VITALS — SYSTOLIC BLOOD PRESSURE: 93 MMHG | DIASTOLIC BLOOD PRESSURE: 53 MMHG

## 2021-07-13 VITALS — DIASTOLIC BLOOD PRESSURE: 54 MMHG | SYSTOLIC BLOOD PRESSURE: 86 MMHG

## 2021-07-13 VITALS — SYSTOLIC BLOOD PRESSURE: 110 MMHG | DIASTOLIC BLOOD PRESSURE: 57 MMHG

## 2021-07-13 VITALS — DIASTOLIC BLOOD PRESSURE: 51 MMHG | SYSTOLIC BLOOD PRESSURE: 98 MMHG

## 2021-07-13 VITALS — SYSTOLIC BLOOD PRESSURE: 105 MMHG | DIASTOLIC BLOOD PRESSURE: 55 MMHG

## 2021-07-13 VITALS — DIASTOLIC BLOOD PRESSURE: 59 MMHG | SYSTOLIC BLOOD PRESSURE: 101 MMHG

## 2021-07-13 PROCEDURE — 10907ZC DRAINAGE OF AMNIOTIC FLUID, THERAPEUTIC FROM PRODUCTS OF CONCEPTION, VIA NATURAL OR ARTIFICIAL OPENING: ICD-10-PCS | Performed by: ADVANCED PRACTICE MIDWIFE

## 2021-07-13 RX ADMIN — MISOPROSTOL SCH MCG: 100 TABLET ORAL at 00:50

## 2021-07-13 RX ADMIN — IBUPROFEN PRN MG: 800 TABLET, FILM COATED ORAL at 15:48

## 2021-07-13 RX ADMIN — IBUPROFEN PRN MG: 800 TABLET, FILM COATED ORAL at 22:35

## 2021-07-13 RX ADMIN — Medication SCH MLS/HR: at 06:11

## 2021-07-13 RX ADMIN — Medication SCH MLS/HR: at 02:00

## 2021-07-13 RX ADMIN — SODIUM CHLORIDE, POTASSIUM CHLORIDE, SODIUM LACTATE AND CALCIUM CHLORIDE SCH MLS/HR: 600; 310; 30; 20 INJECTION, SOLUTION INTRAVENOUS at 06:12

## 2021-07-13 RX ADMIN — SODIUM CHLORIDE, POTASSIUM CHLORIDE, SODIUM LACTATE AND CALCIUM CHLORIDE SCH MLS/HR: 600; 310; 30; 20 INJECTION, SOLUTION INTRAVENOUS at 02:01

## 2021-07-13 RX ADMIN — Medication SCH MLS/HR: at 10:22

## 2021-07-13 NOTE — DNPDOC
[facility] Delivery Note


Delivery Note





DATE OF DELIVERY: 21 at 1151





PREDELIVERY DIAGNOSIS: 38-6/7 weeks' gestation and labor. 





POST DELIVERY DIAGNOSIS: Delivered.





PROCEDURE: Spontaneous vaginal delivery.





MIDWIFE: Deena Bowie CNM, ARCHANA





ANESTHESIA: none.





ESTIMATED BLOOD LOSS: 400 mL.





FINDINGS: 6 pounds 14 ounces; 3110 grams; female infant, Apgar Score 9/9, A2GDM.





DELIVERY SUMMARY: Shanna is a 30-year-old female who is now a  who 

presented to L&D for an induction of labor due to A2GDM with noncompliance with 

medication and recording blood sugars. She had 2 doses of Cytotec and IV Pitocin

for induction. Shanna requested IV pain medication for labor pain. She progressed 

to fully dilated at 1149 and pushed to a living female in OA position with 

restitution to ROT. The anterior shoulder delivered with ease and corpus 

immediately followed. The baby was placed skin-to skin active and crying. The 

cord was clamped after 1 minute and cut by the FOB. The placenta delivered sp

ontaneously and intact at 1156. Uterine hemostasis was achieved via rapid 

infusion of IV Pitocin, fundal massage, and IM Methergine. Her vagina, cervix 

and perineum were inspected and found to be intact. Mom plans to breast and 

formula feed. They are naming her Aliany. Both mom and baby are in stable 

condition. All counts of instruments and sponges are correct.











DEENA BOWIE CNM            2021 12:25

## 2021-07-13 NOTE — IPNPDOC
Obstetrical Progress Note


Date of Service


2021





Subjective


Patient reports she is feeling her contractions.





Objective





Vital Signs








  Date Time  Temp Pulse Resp B/P (MAP) Pulse Ox O2 Delivery O2 Flow Rate FiO2


 


21 04:33 98.4 74 16 98/51 (67)    


 


21 18:10     98 Room Air  











Fetal Assessment


Fetal Heart Rate (FHR):  120


Variability:  Moderate


Accelerations:  Positive


Decelerations:  None


Fetal Heart Rate Tracing:  Category I





Tocometer


Contractions:  Yes


Frequency:  other (3-6 minutes)





Sterile Vaginal Examination


Dilation:  3 cm (last check 4 hours ago was)


Effacement (%):  50%


Station:  -2


Fetal Postion/Presentation:  Cephalic presentation





Assessment and Plan


Age:  30


:  5


Term:  4


Pre-term:  0


Abortions:  0


Livin


EGA at Admission:  38.6


Fetal Status:  Reassuring


Group B Streptococcus:  Positive


Anticipate:  Vaginal Delivery


Additional Comments


She received 2 doses of Cytotec and IV Pitocin has been started per order.











DEENA NI CNM            2021 06:17

## 2021-07-13 NOTE — IPNPDOC
Obstetrical Progress Note


Date of Service


Jul 13, 2021





Subjective


Patient reports she is uncomfortable and desires IV Pain medication.





Objective





Vital Signs








  Date Time  Temp Pulse Resp B/P (MAP) Pulse Ox O2 Delivery O2 Flow Rate FiO2


 


7/13/21 08:11   20   Room Air  


 


7/13/21 07:10  73  102/56 (71)    


 


7/13/21 06:40 98.0       


 


7/12/21 18:10     98   











Fetal Assessment


Fetal Heart Rate (FHR):  125


Variability:  Moderate


Accelerations:  Positive


Decelerations:  None


Fetal Heart Rate Tracing:  Category I





Tocometer


Contractions:  Yes


Frequency:  regular





Sterile Vaginal Examination


Dilation:  4 cm (4-5 cm)


Effacement (%):  other (75%)


Station:  -1


Cervical Consistency:  Soft


Cervical Position:  Anterior


Fetal Postion/Presentation:  Cephalic presentation





Assessment and Plan


Fetal Status:  Reassuring


Group B Streptococcus:  Positive


Anticipate:  Vaginal Delivery


Additional Comments


AROM to moderate amount of clear fluid. Stadol and Phenergan IV ordered for pain

management. IV Pitocin at 10 mu/min.











DEENA NI CNM            Jul 13, 2021 08:32

## 2021-07-14 VITALS — SYSTOLIC BLOOD PRESSURE: 100 MMHG | DIASTOLIC BLOOD PRESSURE: 55 MMHG

## 2021-07-14 RX ADMIN — IBUPROFEN PRN MG: 800 TABLET, FILM COATED ORAL at 06:04

## 2021-08-16 ENCOUNTER — HOSPITAL ENCOUNTER (OUTPATIENT)
Dept: HOSPITAL 53 - M LABSMTC | Age: 31
End: 2021-08-16
Attending: ANESTHESIOLOGY
Payer: COMMERCIAL

## 2021-08-16 DIAGNOSIS — Z20.828: ICD-10-CM

## 2021-08-16 DIAGNOSIS — Z01.812: Primary | ICD-10-CM

## 2021-08-20 ENCOUNTER — HOSPITAL ENCOUNTER (OUTPATIENT)
Dept: HOSPITAL 53 - M SDC | Age: 31
Discharge: HOME | End: 2021-08-20
Attending: OBSTETRICS & GYNECOLOGY
Payer: COMMERCIAL

## 2021-08-20 VITALS — SYSTOLIC BLOOD PRESSURE: 102 MMHG | DIASTOLIC BLOOD PRESSURE: 62 MMHG

## 2021-08-20 VITALS — HEIGHT: 64 IN | BODY MASS INDEX: 26.94 KG/M2 | WEIGHT: 157.8 LBS

## 2021-08-20 DIAGNOSIS — Z30.2: Primary | ICD-10-CM

## 2021-08-20 LAB
HCT VFR BLD AUTO: 36.6 % (ref 36–47)
HGB BLD-MCNC: 12.4 G/DL (ref 12–15.5)
MCH RBC QN AUTO: 31.5 PG (ref 27–33)
MCHC RBC AUTO-ENTMCNC: 33.9 G/DL (ref 32–36.5)
MCV RBC AUTO: 92.9 FL (ref 80–96)
PLATELET # BLD AUTO: 243 10^3/UL (ref 150–450)
RBC # BLD AUTO: 3.94 10^6/UL (ref 4–5.4)
WBC # BLD AUTO: 5.2 10^3/UL (ref 4–10)

## 2021-08-20 PROCEDURE — 81025 URINE PREGNANCY TEST: CPT

## 2021-08-20 PROCEDURE — 88302 TISSUE EXAM BY PATHOLOGIST: CPT

## 2021-08-20 PROCEDURE — 85027 COMPLETE CBC AUTOMATED: CPT

## 2021-08-20 PROCEDURE — 36415 COLL VENOUS BLD VENIPUNCTURE: CPT

## 2021-08-20 PROCEDURE — 58661 LAPAROSCOPY REMOVE ADNEXA: CPT

## 2021-08-20 NOTE — ROOPDOC
[facility] Report Of Operation


Report of Operation


DATE OF PROCEDURE: 8/20/21





PREPROCEDURE DIAGNOSES: Undesired fertility.





POSTPROCEDURE DIAGNOSES: Same.





PROCEDURE PERFORMED: Laparoscopic bilateral salpingectomy. 





SURGEON: Genaro Montes MD





ANESTHESIA: GETA.





ESTIMATED BLOOD LOSS: Approximately 10 mL. 





COMPLICATIONS: none. 





FINDINGS: Normal uterus fallopian tubes and ovaries.  Normal upper abdomen.





SPECIMENS REMOVED: Bilateral fallopian tubes





PROCEDURE NOTE: The patient was taken to the operating room where general 

endotracheal anesthesia was induced.  She was prepped and draped in a sterile 

fashion in the dorsal lithotomy position.  A sponge stick was placed in the 

vagina to use as a manipulator.  A periumbilical incision was made with a 

scalpel.  A Veress needle was placed through this incision while tenting up on 

the skin of the abdomen.  An intra-abdominal location the Veress needle was 

assessed with the use of a saline filled syringe.  A pneumoperitoneum was 

created.  The Veress needle was removed.  A 5 mm trocar using Visiport was 

inserted through this incision.  A 5 and 8 mm suprapubic port were placed under 

direct visualization.  A grasping instrument was used to elevate each fallopian 

tube.  A LigaSure device was used to coagulate and incise broad ligament 

attachments to the fallopian tube.  Both tubes were excised near their origin.  

Both tubes were removed through the suprapubic port.  The pneumoperitoneum was 

released.  All instruments were removed.  The skin was closed with 4-0 Monocryl 

subcuticular sutures.  Sponge instrument and needle counts were correct.  The 

patient went to the recovery room in stable condition.











GENARO MONTES MD             Aug 20, 2021 11:13

## 2023-05-09 ENCOUNTER — HOSPITAL ENCOUNTER (OUTPATIENT)
Dept: HOSPITAL 53 - M SFHCWAGY | Age: 33
End: 2023-05-09
Attending: OBSTETRICS & GYNECOLOGY
Payer: COMMERCIAL

## 2023-05-09 DIAGNOSIS — Z01.419: Primary | ICD-10-CM

## 2023-05-10 ENCOUNTER — HOSPITAL ENCOUNTER (OUTPATIENT)
Dept: HOSPITAL 53 - M LAB REF | Age: 33
End: 2023-05-10
Payer: COMMERCIAL

## 2023-05-10 DIAGNOSIS — Z13.228: Primary | ICD-10-CM

## 2023-05-10 LAB
25(OH)D3 SERPL-MCNC: 13.2 NG/ML (ref 20–100)
ALBUMIN SERPL BCG-MCNC: 3.6 G/DL (ref 3.2–5.2)
ALP SERPL-CCNC: 72 U/L (ref 46–116)
ALT SERPL W P-5'-P-CCNC: 10 U/L (ref 7–40)
AST SERPL-CCNC: 21 U/L (ref ?–34)
BASOPHILS # BLD AUTO: 0.1 10^3/UL (ref 0–0.2)
BASOPHILS NFR BLD AUTO: 1.4 % (ref 0–1)
BILIRUB SERPL-MCNC: 0.4 MG/DL (ref 0.3–1.2)
BUN SERPL-MCNC: 9 MG/DL (ref 9–23)
CALCIUM SERPL-MCNC: 8.5 MG/DL (ref 8.5–10.1)
CHLORIDE SERPL-SCNC: 106 MMOL/L (ref 98–107)
CHOLEST SERPL-MCNC: 146 MG/DL (ref ?–200)
CHOLEST/HDLC SERPL: 3.55 {RATIO} (ref ?–5)
CO2 SERPL-SCNC: 28 MMOL/L (ref 20–31)
CREAT SERPL-MCNC: 0.63 MG/DL (ref 0.55–1.3)
EOSINOPHIL # BLD AUTO: 0.1 10^3/UL (ref 0–0.5)
EOSINOPHIL NFR BLD AUTO: 2.4 % (ref 0–3)
EST. AVERAGE GLUCOSE BLD GHB EST-MCNC: 94 MG/DL (ref 60–110)
GFR SERPL CREATININE-BSD FRML MDRD: > 60 ML/MIN/{1.73_M2} (ref 60–?)
GLUCOSE SERPL-MCNC: 86 MG/DL (ref 60–100)
HCT VFR BLD AUTO: 35.2 % (ref 36–47)
HDLC SERPL-MCNC: 41.1 MG/DL (ref 40–?)
HGB BLD-MCNC: 12 G/DL (ref 12–15.5)
LDLC SERPL CALC-MCNC: 70.9 MG/DL (ref ?–100)
LYMPHOCYTES # BLD AUTO: 1.7 10^3/UL (ref 1.5–5)
LYMPHOCYTES NFR BLD AUTO: 31.3 % (ref 24–44)
MCH RBC QN AUTO: 31.8 PG (ref 27–33)
MCHC RBC AUTO-ENTMCNC: 34.1 G/DL (ref 32–36.5)
MCV RBC AUTO: 93.4 FL (ref 80–96)
MONOCYTES # BLD AUTO: 0.3 10^3/UL (ref 0–0.8)
MONOCYTES NFR BLD AUTO: 5.4 % (ref 2–8)
NEUTROPHILS # BLD AUTO: 3.2 10^3/UL (ref 1.5–8.5)
NEUTROPHILS NFR BLD AUTO: 58.4 % (ref 36–66)
NONHDLC SERPL-MCNC: 104.9 MG/DL
PLATELET # BLD AUTO: 362 10^3/UL (ref 150–450)
POTASSIUM SERPL-SCNC: 3.9 MMOL/L (ref 3.5–5.1)
PROT SERPL-MCNC: 6.8 G/DL (ref 5.7–8.2)
RBC # BLD AUTO: 3.77 10^6/UL (ref 4–5.4)
SODIUM SERPL-SCNC: 140 MMOL/L (ref 136–145)
TRIGL SERPL-MCNC: 170 MG/DL (ref ?–150)
TSH SERPL DL<=0.005 MIU/L-ACNC: 1.31 UIU/ML (ref 0.55–4.78)
WBC # BLD AUTO: 5.5 10^3/UL (ref 4–10)

## 2023-06-07 ENCOUNTER — HOSPITAL ENCOUNTER (OUTPATIENT)
Dept: HOSPITAL 53 - M EKG | Age: 33
End: 2023-06-07
Attending: SURGERY
Payer: COMMERCIAL

## 2023-06-07 DIAGNOSIS — R00.1: ICD-10-CM

## 2023-06-07 DIAGNOSIS — Z01.818: Primary | ICD-10-CM

## 2023-06-09 ENCOUNTER — HOSPITAL ENCOUNTER (OUTPATIENT)
Dept: HOSPITAL 53 - M SDC | Age: 33
Discharge: HOME | End: 2023-06-09
Attending: SURGERY
Payer: COMMERCIAL

## 2023-06-09 VITALS — BODY MASS INDEX: 24.43 KG/M2 | WEIGHT: 146.6 LBS | HEIGHT: 65 IN

## 2023-06-09 VITALS — DIASTOLIC BLOOD PRESSURE: 63 MMHG | OXYGEN SATURATION: 100 % | SYSTOLIC BLOOD PRESSURE: 111 MMHG | TEMPERATURE: 97.7 F

## 2023-06-09 DIAGNOSIS — K42.9: Primary | ICD-10-CM

## 2023-06-09 PROCEDURE — 49591 RPR AA HRN 1ST < 3 CM RDC: CPT

## 2024-07-17 ENCOUNTER — HOSPITAL ENCOUNTER (INPATIENT)
Dept: HOSPITAL 53 - M ED | Age: 34
LOS: 1 days | Discharge: HOME | DRG: 663 | End: 2024-07-18
Attending: INTERNAL MEDICINE | Admitting: INTERNAL MEDICINE
Payer: COMMERCIAL

## 2024-07-17 VITALS — TEMPERATURE: 99.5 F | DIASTOLIC BLOOD PRESSURE: 60 MMHG | OXYGEN SATURATION: 100 % | SYSTOLIC BLOOD PRESSURE: 121 MMHG

## 2024-07-17 VITALS — SYSTOLIC BLOOD PRESSURE: 98 MMHG | DIASTOLIC BLOOD PRESSURE: 56 MMHG | OXYGEN SATURATION: 100 % | TEMPERATURE: 99.1 F

## 2024-07-17 VITALS — SYSTOLIC BLOOD PRESSURE: 108 MMHG | TEMPERATURE: 98.3 F | DIASTOLIC BLOOD PRESSURE: 53 MMHG

## 2024-07-17 VITALS — DIASTOLIC BLOOD PRESSURE: 56 MMHG | OXYGEN SATURATION: 100 % | SYSTOLIC BLOOD PRESSURE: 110 MMHG | TEMPERATURE: 99.1 F

## 2024-07-17 VITALS — WEIGHT: 148.81 LBS | HEIGHT: 62 IN | BODY MASS INDEX: 27.38 KG/M2

## 2024-07-17 VITALS — TEMPERATURE: 98.2 F | SYSTOLIC BLOOD PRESSURE: 109 MMHG | OXYGEN SATURATION: 100 % | DIASTOLIC BLOOD PRESSURE: 55 MMHG

## 2024-07-17 DIAGNOSIS — N39.0: ICD-10-CM

## 2024-07-17 DIAGNOSIS — N93.8: ICD-10-CM

## 2024-07-17 DIAGNOSIS — D50.9: Primary | ICD-10-CM

## 2024-07-17 LAB
ALBUMIN SERPL BCG-MCNC: 3.5 G/DL (ref 3.2–5.2)
ALP SERPL-CCNC: 55 U/L (ref 46–116)
ALT SERPL W P-5'-P-CCNC: 15 U/L (ref 7–40)
APTT BLD: 28.5 SECONDS (ref 24.8–34.2)
AST SERPL-CCNC: < 8 U/L (ref ?–34)
B-HCG SERPL QL: NEGATIVE
BASOPHILS # BLD AUTO: 0.1 10^3/UL (ref 0–0.2)
BASOPHILS NFR BLD AUTO: 1.9 % (ref 0–1)
BILIRUB CONJ SERPL-MCNC: 0.1 MG/DL (ref ?–0.4)
BILIRUB SERPL-MCNC: 0.4 MG/DL (ref 0.3–1.2)
BUN SERPL-MCNC: 7 MG/DL (ref 9–23)
CALCIUM SERPL-MCNC: 9.1 MG/DL (ref 8.5–10.1)
CHLORIDE SERPL-SCNC: 110 MMOL/L (ref 98–107)
CK MB CFR.DF SERPL CALC: 1.53
CK MB SERPL-MCNC: < 1 NG/ML (ref ?–3.6)
CK SERPL-CCNC: 65 U/L (ref 34–145)
CO2 SERPL-SCNC: 24 MMOL/L (ref 20–31)
CREAT SERPL-MCNC: 0.63 MG/DL (ref 0.55–1.3)
EOSINOPHIL # BLD AUTO: 0.1 10^3/UL (ref 0–0.5)
EOSINOPHIL NFR BLD AUTO: 1.7 % (ref 0–3)
GFR SERPL CREATININE-BSD FRML MDRD: > 60 ML/MIN/{1.73_M2} (ref 60–?)
GLUCOSE SERPL-MCNC: 82 MG/DL (ref 60–100)
HCT VFR BLD AUTO: 25.9 % (ref 36–47)
HGB BLD-MCNC: 7.3 G/DL (ref 12–15.5)
INR PPP: 1.07
IRON SATN MFR SERPL: 2.7 % (ref 13.2–45)
IRON SERPL-MCNC: 12 UG/DL (ref 50–170)
LYMPHOCYTES # BLD AUTO: 1.8 10^3/UL (ref 1.5–5)
LYMPHOCYTES NFR BLD AUTO: 33.5 % (ref 24–44)
MAGNESIUM SERPL-MCNC: 2 MG/DL (ref 1.8–2.4)
MCH RBC QN AUTO: 20.5 PG (ref 27–33)
MCHC RBC AUTO-ENTMCNC: 28.2 G/DL (ref 32–36.5)
MCV RBC AUTO: 72.8 FL (ref 80–96)
MONOCYTES # BLD AUTO: 0.3 10^3/UL (ref 0–0.8)
MONOCYTES NFR BLD AUTO: 6.4 % (ref 2–8)
N GONORRHOEA RRNA SPEC QL NAA+PROBE: NEGATIVE
NEUTROPHILS # BLD AUTO: 3 10^3/UL (ref 1.5–8.5)
NEUTROPHILS NFR BLD AUTO: 56.5 % (ref 36–66)
PLATELET # BLD AUTO: 358 10^3/UL (ref 150–450)
POTASSIUM SERPL-SCNC: 4.2 MMOL/L (ref 3.5–5.1)
PROT SERPL-MCNC: 6.7 G/DL (ref 5.7–8.2)
PROTHROMBIN TIME: 13.6 SECONDS (ref 12.5–14.5)
RBC # BLD AUTO: 3.56 10^6/UL (ref 4–5.4)
SODIUM SERPL-SCNC: 140 MMOL/L (ref 136–145)
T VAGINALIS RRNA SPEC QL NAA+PROBE: NOT DETECTED
T4 SERPL-MCNC: 6.6 UG/DL (ref 4.5–10.9)
TIBC SERPL-MCNC: 451 UG/DL (ref 250–425)
TSH SERPL DL<=0.005 MIU/L-ACNC: 1.41 UIU/ML (ref 0.55–4.78)
WBC # BLD AUTO: 5.3 10^3/UL (ref 4–10)

## 2024-07-17 PROCEDURE — 30233N1 TRANSFUSION OF NONAUTOLOGOUS RED BLOOD CELLS INTO PERIPHERAL VEIN, PERCUTANEOUS APPROACH: ICD-10-PCS | Performed by: INTERNAL MEDICINE

## 2024-07-17 RX ADMIN — CEFTRIAXONE ONE MLS/HR: 1 INJECTION, POWDER, FOR SOLUTION INTRAMUSCULAR; INTRAVENOUS at 23:39

## 2024-07-17 RX ADMIN — SODIUM CHLORIDE SCH MLS/HR: 9 INJECTION, SOLUTION INTRAVENOUS at 22:35

## 2024-07-17 RX ADMIN — SULFAMETHOXAZOLE AND TRIMETHOPRIM SCH TAB: 800; 160 TABLET ORAL at 23:44

## 2024-07-18 VITALS — DIASTOLIC BLOOD PRESSURE: 56 MMHG | TEMPERATURE: 98.7 F | OXYGEN SATURATION: 98 % | SYSTOLIC BLOOD PRESSURE: 101 MMHG

## 2024-07-18 VITALS — OXYGEN SATURATION: 98 % | TEMPERATURE: 99.3 F | SYSTOLIC BLOOD PRESSURE: 105 MMHG | DIASTOLIC BLOOD PRESSURE: 65 MMHG

## 2024-07-18 VITALS — TEMPERATURE: 98.7 F | SYSTOLIC BLOOD PRESSURE: 98 MMHG | DIASTOLIC BLOOD PRESSURE: 58 MMHG | OXYGEN SATURATION: 98 %

## 2024-07-18 VITALS — TEMPERATURE: 98.7 F | SYSTOLIC BLOOD PRESSURE: 100 MMHG | DIASTOLIC BLOOD PRESSURE: 59 MMHG | OXYGEN SATURATION: 99 %

## 2024-07-18 VITALS — TEMPERATURE: 98.7 F | DIASTOLIC BLOOD PRESSURE: 58 MMHG | SYSTOLIC BLOOD PRESSURE: 102 MMHG | OXYGEN SATURATION: 98 %

## 2024-07-18 VITALS — TEMPERATURE: 97.9 F | SYSTOLIC BLOOD PRESSURE: 127 MMHG | DIASTOLIC BLOOD PRESSURE: 82 MMHG | OXYGEN SATURATION: 96 %

## 2024-07-18 LAB
ALBUMIN SERPL BCG-MCNC: 3.2 G/DL (ref 3.2–5.2)
ALP SERPL-CCNC: 52 U/L (ref 46–116)
ALT SERPL W P-5'-P-CCNC: 13 U/L (ref 7–40)
AST SERPL-CCNC: 9 U/L (ref ?–34)
BILIRUB SERPL-MCNC: 0.9 MG/DL (ref 0.3–1.2)
BUN SERPL-MCNC: 7 MG/DL (ref 9–23)
CALCIUM SERPL-MCNC: 8.7 MG/DL (ref 8.5–10.1)
CHLORIDE SERPL-SCNC: 111 MMOL/L (ref 98–107)
CO2 SERPL-SCNC: 23 MMOL/L (ref 20–31)
CREAT SERPL-MCNC: 0.7 MG/DL (ref 0.55–1.3)
FERRITIN SERPL-MCNC: 2.2 NG/ML (ref 7.3–270.7)
FT4I SERPL CALC-MCNC: 2.2 % (ref 1.3–4.8)
GFR SERPL CREATININE-BSD FRML MDRD: > 60 ML/MIN/{1.73_M2} (ref 60–?)
GLUCOSE SERPL-MCNC: 73 MG/DL (ref 60–100)
HCT VFR BLD AUTO: 30 % (ref 36–47)
HGB BLD-MCNC: 9 G/DL (ref 12–15.5)
MAGNESIUM SERPL-MCNC: 2 MG/DL (ref 1.8–2.4)
MCH RBC QN AUTO: 22.4 PG (ref 27–33)
MCHC RBC AUTO-ENTMCNC: 30 G/DL (ref 32–36.5)
MCV RBC AUTO: 74.8 FL (ref 80–96)
PLATELET # BLD AUTO: 280 10^3/UL (ref 150–450)
POTASSIUM SERPL-SCNC: 3.8 MMOL/L (ref 3.5–5.1)
PROT SERPL-MCNC: 6 G/DL (ref 5.7–8.2)
RBC # BLD AUTO: 4.01 10^6/UL (ref 4–5.4)
SODIUM SERPL-SCNC: 141 MMOL/L (ref 136–145)
T3RU NFR SERPL: 33.8 % (ref 22.5–37)
WBC # BLD AUTO: 4.8 10^3/UL (ref 4–10)

## 2024-07-18 RX ADMIN — MEDROXYPROGESTERONE ACETATE SCH MG: 5 TABLET ORAL at 09:33

## 2024-07-18 RX ADMIN — DOCUSATE SODIUM SCH MG: 100 CAPSULE, LIQUID FILLED ORAL at 09:34

## 2024-07-18 RX ADMIN — FERROUS SULFATE TAB 325 MG (65 MG ELEMENTAL FE) SCH MG: 325 (65 FE) TAB at 09:33

## 2024-07-18 RX ADMIN — Medication SCH MG: at 09:34

## 2024-07-18 RX ADMIN — SULFAMETHOXAZOLE AND TRIMETHOPRIM SCH TAB: 800; 160 TABLET ORAL at 09:33

## 2024-08-05 ENCOUNTER — HOSPITAL ENCOUNTER (OUTPATIENT)
Dept: HOSPITAL 53 - M ED | Age: 34
Setting detail: OBSERVATION
LOS: 2 days | Discharge: HOME | End: 2024-08-07
Attending: OBSTETRICS & GYNECOLOGY | Admitting: OBSTETRICS & GYNECOLOGY
Payer: MEDICAID

## 2024-08-05 VITALS — HEIGHT: 64 IN | WEIGHT: 151.02 LBS | BODY MASS INDEX: 25.78 KG/M2

## 2024-08-05 DIAGNOSIS — N93.9: ICD-10-CM

## 2024-08-05 DIAGNOSIS — R42: ICD-10-CM

## 2024-08-05 DIAGNOSIS — N88.8: Primary | ICD-10-CM

## 2024-08-05 DIAGNOSIS — N84.1: ICD-10-CM

## 2024-08-05 DIAGNOSIS — D64.9: ICD-10-CM

## 2024-08-05 DIAGNOSIS — Z79.899: ICD-10-CM

## 2024-08-05 LAB
B-HCG SERPL-ACNC: < 2.6 MIU/ML (ref ?–4.2)
BASOPHILS # BLD AUTO: 0.1 10^3/UL (ref 0–0.2)
BASOPHILS NFR BLD AUTO: 1.4 % (ref 0–1)
BUN SERPL-MCNC: 10 MG/DL (ref 9–23)
CALCIUM SERPL-MCNC: 8.8 MG/DL (ref 8.5–10.1)
CHLORIDE SERPL-SCNC: 111 MMOL/L (ref 98–107)
CO2 SERPL-SCNC: 25 MMOL/L (ref 20–31)
CREAT SERPL-MCNC: 0.87 MG/DL (ref 0.55–1.3)
EOSINOPHIL # BLD AUTO: 0.1 10^3/UL (ref 0–0.5)
EOSINOPHIL NFR BLD AUTO: 1.7 % (ref 0–3)
GFR SERPL CREATININE-BSD FRML MDRD: > 60 ML/MIN/{1.73_M2} (ref 60–?)
GLUCOSE SERPL-MCNC: 97 MG/DL (ref 60–100)
HCT VFR BLD AUTO: 32.1 % (ref 36–47)
HGB BLD-MCNC: 10 G/DL (ref 12–15.5)
LYMPHOCYTES # BLD AUTO: 1.5 10^3/UL (ref 1.5–5)
LYMPHOCYTES NFR BLD AUTO: 23.7 % (ref 24–44)
MCH RBC QN AUTO: 24.6 PG (ref 27–33)
MCHC RBC AUTO-ENTMCNC: 31.2 G/DL (ref 32–36.5)
MCV RBC AUTO: 79.1 FL (ref 80–96)
MONOCYTES # BLD AUTO: 0.5 10^3/UL (ref 0–0.8)
MONOCYTES NFR BLD AUTO: 7.1 % (ref 2–8)
NEUTROPHILS # BLD AUTO: 4.2 10^3/UL (ref 1.5–8.5)
NEUTROPHILS NFR BLD AUTO: 65.9 % (ref 36–66)
PLATELET # BLD AUTO: 369 10^3/UL (ref 150–450)
POTASSIUM SERPL-SCNC: 4.6 MMOL/L (ref 3.5–5.1)
RBC # BLD AUTO: 4.06 10^6/UL (ref 4–5.4)
SODIUM SERPL-SCNC: 142 MMOL/L (ref 136–145)
WBC # BLD AUTO: 6.3 10^3/UL (ref 4–10)

## 2024-08-05 PROCEDURE — 74177 CT ABD & PELVIS W/CONTRAST: CPT

## 2024-08-05 PROCEDURE — 36430 TRANSFUSION BLD/BLD COMPNT: CPT

## 2024-08-05 PROCEDURE — 87486 CHLMYD PNEUM DNA AMP PROBE: CPT

## 2024-08-05 PROCEDURE — 96361 HYDRATE IV INFUSION ADD-ON: CPT

## 2024-08-05 PROCEDURE — 87581 M.PNEUMON DNA AMP PROBE: CPT

## 2024-08-05 PROCEDURE — 36415 COLL VENOUS BLD VENIPUNCTURE: CPT

## 2024-08-05 PROCEDURE — 93005 ELECTROCARDIOGRAM TRACING: CPT

## 2024-08-05 PROCEDURE — 86850 RBC ANTIBODY SCREEN: CPT

## 2024-08-05 PROCEDURE — 84484 ASSAY OF TROPONIN QUANT: CPT

## 2024-08-05 PROCEDURE — 76830 TRANSVAGINAL US NON-OB: CPT

## 2024-08-05 PROCEDURE — 88305 TISSUE EXAM BY PATHOLOGIST: CPT

## 2024-08-05 PROCEDURE — 87798 DETECT AGENT NOS DNA AMP: CPT

## 2024-08-05 PROCEDURE — 76856 US EXAM PELVIC COMPLETE: CPT

## 2024-08-05 PROCEDURE — 87633 RESP VIRUS 12-25 TARGETS: CPT

## 2024-08-05 PROCEDURE — 80048 BASIC METABOLIC PNL TOTAL CA: CPT

## 2024-08-05 PROCEDURE — 93976 VASCULAR STUDY: CPT

## 2024-08-05 PROCEDURE — 58558 HYSTEROSCOPY BIOPSY: CPT

## 2024-08-05 PROCEDURE — 86901 BLOOD TYPING SEROLOGIC RH(D): CPT

## 2024-08-05 PROCEDURE — 99285 EMERGENCY DEPT VISIT HI MDM: CPT

## 2024-08-05 PROCEDURE — 82553 CREATINE MB FRACTION: CPT

## 2024-08-05 PROCEDURE — 82550 ASSAY OF CK (CPK): CPT

## 2024-08-05 PROCEDURE — 86920 COMPATIBILITY TEST SPIN: CPT

## 2024-08-05 PROCEDURE — 85027 COMPLETE CBC AUTOMATED: CPT

## 2024-08-05 PROCEDURE — 93041 RHYTHM ECG TRACING: CPT

## 2024-08-05 PROCEDURE — 86900 BLOOD TYPING SEROLOGIC ABO: CPT

## 2024-08-05 PROCEDURE — 96374 THER/PROPH/DIAG INJ IV PUSH: CPT

## 2024-08-05 PROCEDURE — 85025 COMPLETE CBC W/AUTO DIFF WBC: CPT

## 2024-08-05 PROCEDURE — 84702 CHORIONIC GONADOTROPIN TEST: CPT

## 2024-08-05 RX ADMIN — SODIUM CHLORIDE ONE MLS/HR: 9 INJECTION, SOLUTION INTRAVENOUS at 23:58

## 2024-08-06 VITALS — SYSTOLIC BLOOD PRESSURE: 101 MMHG | OXYGEN SATURATION: 94 % | DIASTOLIC BLOOD PRESSURE: 59 MMHG | TEMPERATURE: 98 F

## 2024-08-06 VITALS — DIASTOLIC BLOOD PRESSURE: 52 MMHG | OXYGEN SATURATION: 97 % | SYSTOLIC BLOOD PRESSURE: 95 MMHG

## 2024-08-06 VITALS — TEMPERATURE: 96.9 F | OXYGEN SATURATION: 99 % | SYSTOLIC BLOOD PRESSURE: 84 MMHG | DIASTOLIC BLOOD PRESSURE: 49 MMHG

## 2024-08-06 VITALS — OXYGEN SATURATION: 98 % | TEMPERATURE: 98.7 F | DIASTOLIC BLOOD PRESSURE: 55 MMHG | SYSTOLIC BLOOD PRESSURE: 97 MMHG

## 2024-08-06 VITALS — DIASTOLIC BLOOD PRESSURE: 58 MMHG | SYSTOLIC BLOOD PRESSURE: 104 MMHG | TEMPERATURE: 97.6 F

## 2024-08-06 VITALS — OXYGEN SATURATION: 98 % | SYSTOLIC BLOOD PRESSURE: 108 MMHG | DIASTOLIC BLOOD PRESSURE: 50 MMHG | TEMPERATURE: 98.9 F

## 2024-08-06 VITALS — OXYGEN SATURATION: 96 % | DIASTOLIC BLOOD PRESSURE: 59 MMHG | SYSTOLIC BLOOD PRESSURE: 93 MMHG | TEMPERATURE: 98.1 F

## 2024-08-06 VITALS — SYSTOLIC BLOOD PRESSURE: 106 MMHG | OXYGEN SATURATION: 98 % | DIASTOLIC BLOOD PRESSURE: 64 MMHG | TEMPERATURE: 97.5 F

## 2024-08-06 VITALS — SYSTOLIC BLOOD PRESSURE: 114 MMHG | DIASTOLIC BLOOD PRESSURE: 58 MMHG | OXYGEN SATURATION: 97 %

## 2024-08-06 VITALS — DIASTOLIC BLOOD PRESSURE: 52 MMHG | SYSTOLIC BLOOD PRESSURE: 95 MMHG | TEMPERATURE: 97.6 F | OXYGEN SATURATION: 97 %

## 2024-08-06 VITALS — TEMPERATURE: 98.1 F | DIASTOLIC BLOOD PRESSURE: 54 MMHG | OXYGEN SATURATION: 98 % | SYSTOLIC BLOOD PRESSURE: 96 MMHG

## 2024-08-06 VITALS — OXYGEN SATURATION: 98 % | SYSTOLIC BLOOD PRESSURE: 80 MMHG | DIASTOLIC BLOOD PRESSURE: 53 MMHG | TEMPERATURE: 96.9 F

## 2024-08-06 VITALS — DIASTOLIC BLOOD PRESSURE: 60 MMHG | SYSTOLIC BLOOD PRESSURE: 96 MMHG | TEMPERATURE: 98.1 F

## 2024-08-06 VITALS — OXYGEN SATURATION: 96 % | DIASTOLIC BLOOD PRESSURE: 55 MMHG | TEMPERATURE: 97.6 F | SYSTOLIC BLOOD PRESSURE: 104 MMHG

## 2024-08-06 VITALS — DIASTOLIC BLOOD PRESSURE: 63 MMHG | SYSTOLIC BLOOD PRESSURE: 101 MMHG | TEMPERATURE: 98.3 F

## 2024-08-06 VITALS — OXYGEN SATURATION: 98 % | DIASTOLIC BLOOD PRESSURE: 51 MMHG | SYSTOLIC BLOOD PRESSURE: 89 MMHG

## 2024-08-06 LAB
CK MB CFR.DF SERPL CALC: 1.61
CK MB SERPL-MCNC: < 1 NG/ML (ref ?–3.6)
CK SERPL-CCNC: 62 U/L (ref 34–145)
HCT VFR BLD AUTO: 28.3 % (ref 36–47)
HCT VFR BLD AUTO: 38 % (ref 36–47)
HGB BLD-MCNC: 12.4 G/DL (ref 12–15.5)
HGB BLD-MCNC: 8.9 G/DL (ref 12–15.5)
MCH RBC QN AUTO: 25 PG (ref 27–33)
MCH RBC QN AUTO: 25.7 PG (ref 27–33)
MCHC RBC AUTO-ENTMCNC: 31.4 G/DL (ref 32–36.5)
MCHC RBC AUTO-ENTMCNC: 32.6 G/DL (ref 32–36.5)
MCV RBC AUTO: 78.8 FL (ref 80–96)
MCV RBC AUTO: 79.5 FL (ref 80–96)
PLATELET # BLD AUTO: 287 10^3/UL (ref 150–450)
PLATELET # BLD AUTO: 303 10^3/UL (ref 150–450)
RBC # BLD AUTO: 3.56 10^6/UL (ref 4–5.4)
RBC # BLD AUTO: 4.82 10^6/UL (ref 4–5.4)
WBC # BLD AUTO: 5.5 10^3/UL (ref 4–10)
WBC # BLD AUTO: 6.6 10^3/UL (ref 4–10)

## 2024-08-06 RX ADMIN — IBUPROFEN PRN MG: 600 TABLET, FILM COATED ORAL at 21:14

## 2024-08-06 RX ADMIN — SODIUM CHLORIDE, POTASSIUM CHLORIDE, SODIUM LACTATE AND CALCIUM CHLORIDE SCH MLS/HR: 600; 310; 30; 20 INJECTION, SOLUTION INTRAVENOUS at 23:32

## 2024-08-06 RX ADMIN — ACETAMINOPHEN PRN MG: 325 TABLET ORAL at 17:22

## 2024-08-06 RX ADMIN — DOCUSATE SODIUM SCH MG: 100 CAPSULE, LIQUID FILLED ORAL at 09:00

## 2024-08-06 RX ADMIN — DIPHENHYDRAMINE HYDROCHLORIDE ONE MG: 25 CAPSULE ORAL at 23:31

## 2024-08-06 RX ADMIN — SODIUM CHLORIDE SCH MLS/HR: 9 INJECTION, SOLUTION INTRAVENOUS at 06:55

## 2024-08-06 RX ADMIN — MEDROXYPROGESTERONE ACETATE SCH MG: 5 TABLET ORAL at 12:55

## 2024-08-06 RX ADMIN — SODIUM CHLORIDE, PRESERVATIVE FREE SCH ML: 5 INJECTION INTRAVENOUS at 23:31

## 2024-08-07 VITALS — SYSTOLIC BLOOD PRESSURE: 97 MMHG | OXYGEN SATURATION: 100 % | DIASTOLIC BLOOD PRESSURE: 53 MMHG

## 2024-08-07 VITALS — SYSTOLIC BLOOD PRESSURE: 95 MMHG | OXYGEN SATURATION: 99 % | DIASTOLIC BLOOD PRESSURE: 60 MMHG

## 2024-08-07 VITALS — SYSTOLIC BLOOD PRESSURE: 106 MMHG | OXYGEN SATURATION: 99 % | DIASTOLIC BLOOD PRESSURE: 55 MMHG

## 2024-08-07 VITALS — OXYGEN SATURATION: 98 % | DIASTOLIC BLOOD PRESSURE: 58 MMHG | SYSTOLIC BLOOD PRESSURE: 101 MMHG

## 2024-08-07 VITALS — OXYGEN SATURATION: 98 % | SYSTOLIC BLOOD PRESSURE: 101 MMHG | DIASTOLIC BLOOD PRESSURE: 64 MMHG

## 2024-08-07 VITALS — OXYGEN SATURATION: 100 % | DIASTOLIC BLOOD PRESSURE: 54 MMHG | SYSTOLIC BLOOD PRESSURE: 100 MMHG

## 2024-08-07 VITALS — OXYGEN SATURATION: 97 % | SYSTOLIC BLOOD PRESSURE: 101 MMHG | DIASTOLIC BLOOD PRESSURE: 59 MMHG

## 2024-08-07 VITALS — OXYGEN SATURATION: 100 % | SYSTOLIC BLOOD PRESSURE: 103 MMHG | DIASTOLIC BLOOD PRESSURE: 55 MMHG

## 2024-08-07 VITALS — TEMPERATURE: 98 F

## 2024-08-07 LAB
HCT VFR BLD AUTO: 38.4 % (ref 36–47)
HGB BLD-MCNC: 12.3 G/DL (ref 12–15.5)
MCH RBC QN AUTO: 25.7 PG (ref 27–33)
MCHC RBC AUTO-ENTMCNC: 32 G/DL (ref 32–36.5)
MCV RBC AUTO: 80.3 FL (ref 80–96)
PLATELET # BLD AUTO: 271 10^3/UL (ref 150–450)
RBC # BLD AUTO: 4.78 10^6/UL (ref 4–5.4)
WBC # BLD AUTO: 5.4 10^3/UL (ref 4–10)

## 2024-08-07 RX ADMIN — SILVER NITRATE APPLICATORS ONE EA: 25; 75 STICK TOPICAL at 12:22

## 2024-08-07 RX ADMIN — SODIUM CHLORIDE, POTASSIUM CHLORIDE, SODIUM LACTATE AND CALCIUM CHLORIDE SCH MLS/HR: 600; 310; 30; 20 INJECTION, SOLUTION INTRAVENOUS at 12:35
